# Patient Record
Sex: FEMALE | Race: WHITE
[De-identification: names, ages, dates, MRNs, and addresses within clinical notes are randomized per-mention and may not be internally consistent; named-entity substitution may affect disease eponyms.]

---

## 2018-01-22 ENCOUNTER — HOSPITAL ENCOUNTER (OUTPATIENT)
Dept: HOSPITAL 63 - MAMMO | Age: 73
Discharge: HOME | End: 2018-01-22
Attending: INTERNAL MEDICINE
Payer: OTHER GOVERNMENT

## 2018-01-22 DIAGNOSIS — R92.8: Primary | ICD-10-CM

## 2018-01-22 PROCEDURE — 77066 DX MAMMO INCL CAD BI: CPT

## 2018-01-22 NOTE — RAD
DATE: 1/22/2018



EXAM: DIGITAL DIAGNOSTIC BILATERAL    



HISTORY: Screening Mammogram.  Transgender hormone therapy.



COMPARISON: Screening mammogram 1/6/2017, 12/17/2015, 11/4/2014



This study was interpreted with the benefit of Computerized Aided Detection

(CAD).



The breast parenchyma shows scattered fibroglandular densities. Breast

parenchyma level B.



FINDINGS: Bilateral digital 2-D CC and MLO views. No suspicious mass,

calcification or architectural distortion. No significant change from prior

examination    



IMPRESSION: No mammographic evidence of malignancy. Recommend routine

screening mammogram in 12 months. 



BI-RADS CATEGORY: 1 NEGATIVE



RECOMMENDED FOLLOW-UP: 12M 12 MONTH FOLLOW-UP



PQRS compliance statement: Patient information was entered into a reminder

system with a target due date     for the next mammogram.



Mammography is a sensitive method for finding small breast cancers, but it

does not detect them all and is not a substitute for careful clinical

examination.  A negative mammogram does not negate a clinically suspicious

finding and should not result in delay in biopsying a clinically suspicious

abnormality.



"Our facility is accredited by the [employer] Mammography

Program."

## 2019-01-23 ENCOUNTER — HOSPITAL ENCOUNTER (OUTPATIENT)
Dept: HOSPITAL 63 - MAMMO | Age: 74
Discharge: HOME | End: 2019-01-23
Attending: INTERNAL MEDICINE
Payer: OTHER GOVERNMENT

## 2019-01-23 DIAGNOSIS — Z87.890: ICD-10-CM

## 2019-01-23 DIAGNOSIS — F64.0: Primary | ICD-10-CM

## 2019-01-23 PROCEDURE — 77066 DX MAMMO INCL CAD BI: CPT

## 2019-01-23 NOTE — RAD
DATE: 1/23/2019



EXAM: DIGITAL DIAGNOSTIC BILATERAL



HISTORY: Transgender screening



COMPARISON: 1/22/2018



This study was interpreted with the benefit of Computerized Aided Detection

(CAD).





Breast Density: SCATTERED The breast parenchyma shows scattered fibroglandular

densities. Breast parenchyma level B.





FINDINGS: No new or enlarging breast densities are seen.  Scattered

microcalcifications are again noted.  The distribution suggests a benign

etiology.  





IMPRESSION: Stable mammograms without evidence of malignancy.





BI-RADS CATEGORY: 2 BENIGN FINDING(S)



RECOMMENDED FOLLOW-UP: 12M 12 MONTH FOLLOW-UP



PQRS compliance statement: Patient information was entered into a reminder

system with a target due date     for the next mammogram.



Mammography is a sensitive method for finding small breast cancers, but it

does not detect them all and is not a substitute for careful clinical

examination.  A negative mammogram does not negate a clinically suspicious

finding and should not result in delay in biopsying a clinically suspicious

abnormality.



"Our facility is accredited by the [employer] Mammography

Program."

## 2020-02-07 ENCOUNTER — HOSPITAL ENCOUNTER (OUTPATIENT)
Dept: HOSPITAL 63 - MAMMO | Age: 75
Discharge: HOME | End: 2020-02-07
Attending: INTERNAL MEDICINE
Payer: OTHER GOVERNMENT

## 2020-02-07 DIAGNOSIS — N63.20: ICD-10-CM

## 2020-02-07 DIAGNOSIS — Z12.31: Primary | ICD-10-CM

## 2020-02-07 PROCEDURE — 77067 SCR MAMMO BI INCL CAD: CPT

## 2020-02-10 NOTE — RAD
DATE: February 7, 2020



EXAM: DIGITAL SCREEN BILAT W/CAD



HISTORY: Screening study.



COMPARISON: 2018 and 2019



This study was interpreted with the benefit of Computerized Aided Detection

(CAD).







FINDINGS:



Breast Density: SCATTERED The breast parenchyma shows scattered fibroglandular

densities. Breast parenchyma level B..  There are no dominant suspicious

masses, suspicious microcalcifications or evidence of architectural

distortion. Nodule of the lateral aspect of the left breast in the CC

projection is stable.







IMPRESSION: No mammographic indicators for malignancy.







BI-RADS CATEGORY: 2 BENIGN FINDING



RECOMMENDED FOLLOW-UP: 12M 12 MONTH FOLLOW-UP



PQRS compliance statement: Patient information was entered into a reminder

system with a target due date February 8, 2021 for the next mammogram.



Mammography is a sensitive method for finding small breast cancers, but it

does not detect them all and is not a substitute for careful clinical

examination.  A negative mammogram does not negate a clinically suspicious

finding and should not result in delay in biopsying a clinically suspicious

abnormality.



"Our facility is accredited by the [employer] Mammography

Program."



The patient's breast density may affect the ability of mammography to detect

breast cancer. There are 4 categories of breast density, A, B, C and D. Breast

density A means that most of the breast tissue is replaced with adipose tissue

and therefore is not dense. Breast density B means that the breast tissue is

mildly dense and scattered. Breast density C means that the breast tissue is

heterogeneously dense. Breast density D means that the breast tissue is very

dense. Breast densities especially C and D may decrease the sensitivity of

mammography to detect breast cancer. Therefore, the patient may benefit from

3-D breast mammography (3D breast tomography) as a part of their screening

mammogram. Insurance may or may not pay for this additional imaging. The

patient's breast density based on today's mammogram is category B.